# Patient Record
Sex: FEMALE | URBAN - METROPOLITAN AREA
[De-identification: names, ages, dates, MRNs, and addresses within clinical notes are randomized per-mention and may not be internally consistent; named-entity substitution may affect disease eponyms.]

---

## 2020-05-08 ENCOUNTER — OFFICE VISIT (OUTPATIENT)
Dept: URBAN - METROPOLITAN AREA CLINIC 58 | Facility: CLINIC | Age: 25
End: 2020-05-08
Payer: COMMERCIAL

## 2020-05-08 DIAGNOSIS — D31.32 BENIGN NEOPLASM OF LT CHOROID: ICD-10-CM

## 2020-05-08 PROCEDURE — 92004 COMPRE OPH EXAM NEW PT 1/>: CPT | Performed by: OPTOMETRIST

## 2020-05-08 RX ORDER — CEPHALEXIN 500 MG/1
500 MG CAPSULE ORAL
Qty: 14 | Refills: 0 | Status: INACTIVE
Start: 2020-05-08 | End: 2020-06-10

## 2020-05-08 ASSESSMENT — INTRAOCULAR PRESSURE
OS: 14
OD: 14

## 2020-05-08 ASSESSMENT — KERATOMETRY
OS: 43.75
OD: 43.50

## 2020-05-08 NOTE — IMPRESSION/PLAN
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 Plan: Discussed diagnosis in detail with patient. No treatment is required at this time. Will continue to observe condition and or symptoms. Call if [address] worsens.

## 2020-05-08 NOTE — IMPRESSION/PLAN
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 Plan: Discussed diagnosis in detail with patient. Will continue to observe condition and or symptoms. Start Keflex 500mg po bid. OK to do gentamycin she got from another doctor qid OS x 1 wk if she wants, but she doesn't have to. Call if worse.

## 2020-06-10 ENCOUNTER — OFFICE VISIT (OUTPATIENT)
Dept: URBAN - METROPOLITAN AREA CLINIC 58 | Facility: CLINIC | Age: 25
End: 2020-06-10
Payer: COMMERCIAL

## 2020-06-10 DIAGNOSIS — H00.025 HORDEOLUM INTERNUM LEFT LOWER EYELID: Primary | ICD-10-CM

## 2020-06-10 PROCEDURE — 92012 INTRM OPH EXAM EST PATIENT: CPT | Performed by: OPTOMETRIST

## 2020-06-10 ASSESSMENT — INTRAOCULAR PRESSURE
OD: 11
OS: 12

## 2020-06-10 NOTE — IMPRESSION/PLAN
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 Plan: Discussed diagnosis and options in detail with patient  Patient would like to see Dr. Leatha Lim for evaluation.